# Patient Record
Sex: MALE | Race: WHITE | NOT HISPANIC OR LATINO | URBAN - METROPOLITAN AREA
[De-identification: names, ages, dates, MRNs, and addresses within clinical notes are randomized per-mention and may not be internally consistent; named-entity substitution may affect disease eponyms.]

---

## 2021-10-17 ENCOUNTER — EMERGENCY (EMERGENCY)
Facility: HOSPITAL | Age: 66
LOS: 1 days | Discharge: HOME | End: 2021-10-17
Attending: EMERGENCY MEDICINE | Admitting: EMERGENCY MEDICINE
Payer: MEDICARE

## 2021-10-17 VITALS
DIASTOLIC BLOOD PRESSURE: 88 MMHG | WEIGHT: 149.91 LBS | SYSTOLIC BLOOD PRESSURE: 160 MMHG | HEART RATE: 52 BPM | RESPIRATION RATE: 14 BRPM | HEIGHT: 67 IN | TEMPERATURE: 98 F | OXYGEN SATURATION: 98 %

## 2021-10-17 VITALS
RESPIRATION RATE: 14 BRPM | TEMPERATURE: 98 F | DIASTOLIC BLOOD PRESSURE: 70 MMHG | OXYGEN SATURATION: 98 % | HEART RATE: 53 BPM | SYSTOLIC BLOOD PRESSURE: 122 MMHG

## 2021-10-17 LAB
ALBUMIN SERPL ELPH-MCNC: 3.6 G/DL — SIGNIFICANT CHANGE UP (ref 3.3–5)
ALP SERPL-CCNC: 66 U/L — SIGNIFICANT CHANGE UP (ref 30–120)
ALT FLD-CCNC: 42 U/L DA — SIGNIFICANT CHANGE UP (ref 10–60)
ANION GAP SERPL CALC-SCNC: 6 MMOL/L — SIGNIFICANT CHANGE UP (ref 5–17)
APPEARANCE UR: CLEAR — SIGNIFICANT CHANGE UP
AST SERPL-CCNC: 29 U/L — SIGNIFICANT CHANGE UP (ref 10–40)
BASOPHILS # BLD AUTO: 0.04 K/UL — SIGNIFICANT CHANGE UP (ref 0–0.2)
BASOPHILS NFR BLD AUTO: 0.5 % — SIGNIFICANT CHANGE UP (ref 0–2)
BILIRUB SERPL-MCNC: 0.6 MG/DL — SIGNIFICANT CHANGE UP (ref 0.2–1.2)
BILIRUB UR-MCNC: NEGATIVE — SIGNIFICANT CHANGE UP
BUN SERPL-MCNC: 23 MG/DL — SIGNIFICANT CHANGE UP (ref 7–23)
CALCIUM SERPL-MCNC: 8.6 MG/DL — SIGNIFICANT CHANGE UP (ref 8.4–10.5)
CHLORIDE SERPL-SCNC: 104 MMOL/L — SIGNIFICANT CHANGE UP (ref 96–108)
CO2 SERPL-SCNC: 27 MMOL/L — SIGNIFICANT CHANGE UP (ref 22–31)
COLOR SPEC: YELLOW — SIGNIFICANT CHANGE UP
CREAT SERPL-MCNC: 0.77 MG/DL — SIGNIFICANT CHANGE UP (ref 0.5–1.3)
DIFF PNL FLD: NEGATIVE — SIGNIFICANT CHANGE UP
EOSINOPHIL # BLD AUTO: 0.1 K/UL — SIGNIFICANT CHANGE UP (ref 0–0.5)
EOSINOPHIL NFR BLD AUTO: 1.3 % — SIGNIFICANT CHANGE UP (ref 0–6)
GLUCOSE SERPL-MCNC: 97 MG/DL — SIGNIFICANT CHANGE UP (ref 70–99)
GLUCOSE UR QL: NEGATIVE MG/DL — SIGNIFICANT CHANGE UP
HCT VFR BLD CALC: 41.9 % — SIGNIFICANT CHANGE UP (ref 39–50)
HGB BLD-MCNC: 14.1 G/DL — SIGNIFICANT CHANGE UP (ref 13–17)
IMM GRANULOCYTES NFR BLD AUTO: 0.3 % — SIGNIFICANT CHANGE UP (ref 0–1.5)
KETONES UR-MCNC: NEGATIVE — SIGNIFICANT CHANGE UP
LEUKOCYTE ESTERASE UR-ACNC: NEGATIVE — SIGNIFICANT CHANGE UP
LIDOCAIN IGE QN: 118 U/L — SIGNIFICANT CHANGE UP (ref 73–393)
LYMPHOCYTES # BLD AUTO: 0.96 K/UL — LOW (ref 1–3.3)
LYMPHOCYTES # BLD AUTO: 12.9 % — LOW (ref 13–44)
MCHC RBC-ENTMCNC: 31.4 PG — SIGNIFICANT CHANGE UP (ref 27–34)
MCHC RBC-ENTMCNC: 33.7 GM/DL — SIGNIFICANT CHANGE UP (ref 32–36)
MCV RBC AUTO: 93.3 FL — SIGNIFICANT CHANGE UP (ref 80–100)
MONOCYTES # BLD AUTO: 0.77 K/UL — SIGNIFICANT CHANGE UP (ref 0–0.9)
MONOCYTES NFR BLD AUTO: 10.4 % — SIGNIFICANT CHANGE UP (ref 2–14)
NEUTROPHILS # BLD AUTO: 5.54 K/UL — SIGNIFICANT CHANGE UP (ref 1.8–7.4)
NEUTROPHILS NFR BLD AUTO: 74.6 % — SIGNIFICANT CHANGE UP (ref 43–77)
NITRITE UR-MCNC: NEGATIVE — SIGNIFICANT CHANGE UP
NRBC # BLD: 0 /100 WBCS — SIGNIFICANT CHANGE UP (ref 0–0)
PH UR: 6.5 — SIGNIFICANT CHANGE UP (ref 5–8)
PLATELET # BLD AUTO: 175 K/UL — SIGNIFICANT CHANGE UP (ref 150–400)
POTASSIUM SERPL-MCNC: 4.2 MMOL/L — SIGNIFICANT CHANGE UP (ref 3.5–5.3)
POTASSIUM SERPL-SCNC: 4.2 MMOL/L — SIGNIFICANT CHANGE UP (ref 3.5–5.3)
PROT SERPL-MCNC: 6.8 G/DL — SIGNIFICANT CHANGE UP (ref 6–8.3)
PROT UR-MCNC: NEGATIVE MG/DL — SIGNIFICANT CHANGE UP
RBC # BLD: 4.49 M/UL — SIGNIFICANT CHANGE UP (ref 4.2–5.8)
RBC # FLD: 12.3 % — SIGNIFICANT CHANGE UP (ref 10.3–14.5)
SODIUM SERPL-SCNC: 137 MMOL/L — SIGNIFICANT CHANGE UP (ref 135–145)
SP GR SPEC: 1 — LOW (ref 1.01–1.02)
UROBILINOGEN FLD QL: NEGATIVE MG/DL — SIGNIFICANT CHANGE UP
WBC # BLD: 7.43 K/UL — SIGNIFICANT CHANGE UP (ref 3.8–10.5)
WBC # FLD AUTO: 7.43 K/UL — SIGNIFICANT CHANGE UP (ref 3.8–10.5)

## 2021-10-17 PROCEDURE — 83690 ASSAY OF LIPASE: CPT

## 2021-10-17 PROCEDURE — 81003 URINALYSIS AUTO W/O SCOPE: CPT

## 2021-10-17 PROCEDURE — 85025 COMPLETE CBC W/AUTO DIFF WBC: CPT

## 2021-10-17 PROCEDURE — 96361 HYDRATE IV INFUSION ADD-ON: CPT

## 2021-10-17 PROCEDURE — 74177 CT ABD & PELVIS W/CONTRAST: CPT | Mod: 26,MG

## 2021-10-17 PROCEDURE — 36415 COLL VENOUS BLD VENIPUNCTURE: CPT

## 2021-10-17 PROCEDURE — 96365 THER/PROPH/DIAG IV INF INIT: CPT

## 2021-10-17 PROCEDURE — 74177 CT ABD & PELVIS W/CONTRAST: CPT

## 2021-10-17 PROCEDURE — G1004: CPT

## 2021-10-17 PROCEDURE — 99284 EMERGENCY DEPT VISIT MOD MDM: CPT | Mod: 25

## 2021-10-17 PROCEDURE — 87086 URINE CULTURE/COLONY COUNT: CPT

## 2021-10-17 PROCEDURE — 99285 EMERGENCY DEPT VISIT HI MDM: CPT

## 2021-10-17 PROCEDURE — 80053 COMPREHEN METABOLIC PANEL: CPT

## 2021-10-17 RX ORDER — ROSUVASTATIN CALCIUM 5 MG/1
1 TABLET ORAL
Qty: 0 | Refills: 0 | DISCHARGE

## 2021-10-17 RX ORDER — SODIUM CHLORIDE 9 MG/ML
1000 INJECTION INTRAMUSCULAR; INTRAVENOUS; SUBCUTANEOUS ONCE
Refills: 0 | Status: COMPLETED | OUTPATIENT
Start: 2021-10-17 | End: 2021-10-17

## 2021-10-17 RX ORDER — PIPERACILLIN AND TAZOBACTAM 4; .5 G/20ML; G/20ML
3.38 INJECTION, POWDER, LYOPHILIZED, FOR SOLUTION INTRAVENOUS ONCE
Refills: 0 | Status: COMPLETED | OUTPATIENT
Start: 2021-10-17 | End: 2021-10-17

## 2021-10-17 RX ORDER — ASPIRIN/CALCIUM CARB/MAGNESIUM 324 MG
0 TABLET ORAL
Qty: 0 | Refills: 0 | DISCHARGE

## 2021-10-17 RX ADMIN — PIPERACILLIN AND TAZOBACTAM 3.38 GRAM(S): 4; .5 INJECTION, POWDER, LYOPHILIZED, FOR SOLUTION INTRAVENOUS at 16:30

## 2021-10-17 RX ADMIN — SODIUM CHLORIDE 1000 MILLILITER(S): 9 INJECTION INTRAMUSCULAR; INTRAVENOUS; SUBCUTANEOUS at 13:40

## 2021-10-17 RX ADMIN — SODIUM CHLORIDE 1000 MILLILITER(S): 9 INJECTION INTRAMUSCULAR; INTRAVENOUS; SUBCUTANEOUS at 14:30

## 2021-10-17 RX ADMIN — PIPERACILLIN AND TAZOBACTAM 200 GRAM(S): 4; .5 INJECTION, POWDER, LYOPHILIZED, FOR SOLUTION INTRAVENOUS at 15:53

## 2021-10-17 NOTE — ED ADULT NURSE NOTE - OBJECTIVE STATEMENT
Right positional groin pain since last night, no urinary complaints, no fever. Denies trauma, no testicular pain.

## 2021-10-17 NOTE — ED PROVIDER NOTE - PROGRESS NOTE DETAILS
Giovanni WATTERS for attending Dr. Giang: 64 y/o M with a PMHx of HLD presents to the ED c/o R groin pain since yesterday worse w/ palpation or positional changes.  No prior abd surgery. Denies fever, chills, N/V/D, constipation, dysuria, hematuria, frequency, testicular pain, or any other complaints. PMD: Dr. Huffman  Physical Exam: WD, WN, in NAD, PERRL, Pink conjunctiva, no scleral icterus, moist mucous membranes,  abd: soft & nontender RUQ LUQ LLQ RLQ, +tenderness inguinal region with questionable hernia, testicles nontender, penis: no rash, lesions, or discharge. Giovanni WATTERS for attending Dr. Giang: 66 y/o M with a PMHx of HLD presents to the ED c/o R groin pain since yesterday worse w/ palpation or positional changes.  No prior abd surgery. Denies fever, chills, N/V/D, constipation, dysuria, hematuria, frequency, testicular pain, or any other complaints. Pt declining pain meds. PMD: Dr. Huffman  Physical Exam: WD, WN, in NAD, PERRL, Pink conjunctiva, no scleral icterus, moist mucous membranes,  abd: soft & nontender RUQ LUQ LLQ RLQ, +tenderness inguinal region with questionable hernia, testicles nontender, penis: no rash, lesions, or discharge. Giovanni WATTERS for attending Dr. Giang: 66 y/o M with a PMHx of HLD presents to the ED c/o R groin pain since yesterday worse w/ palpation or positional changes.  No prior abd surgery. Denies fever, chills, N/V/D, constipation, dysuria, hematuria, frequency, testicular pain, or any other complaints. Pt declining pain meds. PMD: Dr. Huffman  Physical Exam: WD, WN, in NAD, PERRL, Pink conjunctiva, no scleral icterus, moist mucous membranes, s1s2 rrr, lungs cta, abd: soft & nontender RUQ LUQ LLQ  +tenderness right inguinal region with questionable hernia, testicles nontender, penis: no rash, lesions, or discharge. Reevaluated patient at bedside.  Patient feeling well. Discussed the results of labs and CT. Copies of all available reports given.   An opportunity to ask questions was provided.  Discussed the importance of prompt, close medical follow-up.  Patient will return with any changes, concerns or persistent/worsening symptoms.  Understanding of all instructions verbalized.

## 2021-10-17 NOTE — ED PROVIDER NOTE - PATIENT PORTAL LINK FT
You can access the FollowMyHealth Patient Portal offered by [facility] by registering at the following website: [email or web address]. By joining [employer]’s FollowMyHealth portal, you will also be able to view your health information using other applications (apps) compatible with our system.

## 2021-10-17 NOTE — ED PROVIDER NOTE - CARE PROVIDER_API CALL
May Reyes)  Gastroenterology  [address] [place] [address]  Phone: [phone]  Fax: [phone]  Follow Up Time: 1-3 Days

## 2021-10-17 NOTE — ED PROVIDER NOTE - CLINICAL SUMMARY MEDICAL DECISION MAKING FREE TEXT BOX
65 M with R inguinal pain since last night, no injury, no urinary complaints. TTP R inguinal area. R/o hernia or acute intra-abdominal pathology - declined analgesia at this time - labs, CT abd/pelvis pending

## 2021-10-17 NOTE — ED PROVIDER NOTE - OBJECTIVE STATEMENT
66 y/o M with a PMHx of HLD presents to the ED c/o R groin pain since last night. Worse w/ palpation or positional changes.  No fall, trauma, injury. No prior abd surgery. Denies fever, chills, N/V/D, constipation, dysuria, hematuria, frequency, testicular pain, or any other complaints. Pt declining pain meds at this time. PMD: Dr. Huffman

## 2021-10-19 LAB
CULTURE RESULTS: NO GROWTH — SIGNIFICANT CHANGE UP
SPECIMEN SOURCE: SIGNIFICANT CHANGE UP

## 2024-08-23 NOTE — ED PROVIDER NOTE - NSICDXPASTMEDICALHX_GEN_ALL_CORE_FT
Resident
PAST MEDICAL HISTORY:  H/O vasectomy     History of tonsillectomy     History of tonsillectomy and adenoidectomy     HLD (hyperlipidemia)

## 2025-02-01 ENCOUNTER — EMERGENCY (EMERGENCY)
Facility: HOSPITAL | Age: 70
LOS: 1 days | Discharge: HOME | End: 2025-02-01
Attending: EMERGENCY MEDICINE | Admitting: EMERGENCY MEDICINE
Payer: MEDICARE

## 2025-02-01 VITALS
OXYGEN SATURATION: 97 % | DIASTOLIC BLOOD PRESSURE: 78 MMHG | RESPIRATION RATE: 18 BRPM | HEART RATE: 68 BPM | SYSTOLIC BLOOD PRESSURE: 128 MMHG

## 2025-02-01 VITALS
SYSTOLIC BLOOD PRESSURE: 131 MMHG | HEART RATE: 47 BPM | TEMPERATURE: 98 F | RESPIRATION RATE: 19 BRPM | OXYGEN SATURATION: 96 % | HEIGHT: 68 IN | DIASTOLIC BLOOD PRESSURE: 80 MMHG | WEIGHT: 149.91 LBS

## 2025-02-01 PROBLEM — Z98.52 VASECTOMY STATUS: Chronic | Status: ACTIVE | Noted: 2021-10-17

## 2025-02-01 PROBLEM — Z98.890 OTHER SPECIFIED POSTPROCEDURAL STATES: Chronic | Status: ACTIVE | Noted: 2021-10-17

## 2025-02-01 PROBLEM — Z90.89 ACQUIRED ABSENCE OF OTHER ORGANS: Chronic | Status: ACTIVE | Noted: 2021-10-17

## 2025-02-01 PROBLEM — E78.5 HYPERLIPIDEMIA, UNSPECIFIED: Chronic | Status: ACTIVE | Noted: 2021-10-17

## 2025-02-01 PROCEDURE — 93971 EXTREMITY STUDY: CPT

## 2025-02-01 PROCEDURE — 99284 EMERGENCY DEPT VISIT MOD MDM: CPT | Mod: 25

## 2025-02-01 PROCEDURE — 99284 EMERGENCY DEPT VISIT MOD MDM: CPT | Mod: FS

## 2025-02-01 PROCEDURE — 93971 EXTREMITY STUDY: CPT | Mod: 26,LT

## 2025-02-01 RX ORDER — NAPROXEN 500 MG
1 TABLET ORAL
Qty: 20 | Refills: 0
Start: 2025-02-01 | End: 2025-02-10

## 2025-02-01 NOTE — ED ADULT TRIAGE NOTE - CHIEF COMPLAINT QUOTE
pt ambulated into the ED C/O left calf pain s/p skiing accident 8 days ago. edema noted to left calf, and ecchymosis noted to left heel.

## 2025-02-01 NOTE — ED ADULT NURSE NOTE - NSICDXPASTMEDICALHX_GEN_ALL_CORE_FT
PAST MEDICAL HISTORY:  H/O vasectomy     History of tonsillectomy     History of tonsillectomy and adenoidectomy     HLD (hyperlipidemia)

## 2025-02-01 NOTE — ED ADULT NURSE NOTE - NS ED NURSE DC PT EDUCATION RESOURCES
General Sunscreen Counseling: Recommend SPF 50 or higher, reapplying every 80 minutes or directly upon exiting water. Detail Level: Detailed Yes

## 2025-02-01 NOTE — ED PROVIDER NOTE - OBJECTIVE STATEMENT
69-year-old male with past medical history of hyperlipidemia presents today due to a left calf injury.  Patient 8 days ago was skiing in which he fell and felt pain to his calf.  Patient notes since then his calf increased in size with bruising but is smaller than initial injury.  Patient presented today to rule out a blood clot.  Patient denies numbness, weakness, tingling, limited range of motion, chest pain, shortness of breath, history of blood clots, blood thinning medications, or any other complaints.

## 2025-02-01 NOTE — ED PROVIDER NOTE - NSFOLLOWUPINSTRUCTIONS_ED_ALL_ED_FT
1) Follow-up with Orthopedics, See referred doctor. Call today/next business day for close, prompt follow-up.  2) Return to Emergency room for any worsening or persistent pain, weakness, numbness, fever, color change to extremity, or any other concerning symptoms.  3) Take ibuprofen 600 mg every  6 hours as needed.   4) You can consider discussing with your doctor if physical therapy or further imaging as an MRI may be beneficial.    Hematoma    WHAT YOU NEED TO KNOW:    A hematoma is a collection of blood. A bruise is a type of hematoma. A hematoma may form in a muscle or in the tissues just under the skin. A hematoma that forms under the skin will feel like a bump or hard mass. Hematomas can happen anywhere in your body, including in your brain. Your body may break down and absorb a mild hematoma on its own. A more serious hematoma may need treatment.    DISCHARGE INSTRUCTIONS:    Medicines: You may need any of the following:    Prescription pain medicine may be given. Ask how to take this medicine safely.    NSAIDs, such as ibuprofen, help decrease swelling, pain, and fever. This medicine is available with or without a doctor's order. NSAIDs can cause stomach bleeding or kidney problems in certain people. If you take blood thinner medicine, always ask your healthcare provider if NSAIDs are safe for you. Always read the medicine label and follow directions.    Antibiotics prevent or treat a bacterial infection.    Take your medicine as directed. Contact your healthcare provider if you think your medicine is not helping or if you have side effects. Tell your provider if you are allergic to any medicine. Keep a list of the medicines, vitamins, and herbs you take. Include the amounts, and when and why you take them. Bring the list or the pill bottles to follow-up visits. Carry your medicine list with you in case of an emergency.  Return to the emergency department if:    You have new or worsening pain, or pain that does not get better with medicine.    You have a fever.    You have trouble moving the body part that has the hematoma.  Contact your healthcare provider if:    You have questions or concerns about your condition or care.    Follow up with your healthcare provider as directed: You may need to have surgery if your hematoma is severe. You may also need other tests to make sure there is no other damage that needs to be treated. Write down your questions so you remember to ask them during your visits.    Self-care:    Rest the area. Rest will help your body heal and will also help prevent more damage.    Apply ice as directed. Ice helps reduce swelling. Ice may also help prevent tissue damage. Use an ice pack, or put crushed ice in a bag. Cover it with a towel. Place it on your hematoma for 20 minutes every hour, or as directed. Ask how many times each day to apply ice, and for how many days.    Compress the injury if possible. Lightly wrap the injury with an elastic or soft bandage. This may help control swelling. Ask your healthcare provider how to wrap your injury properly.    Elevate the area as directed. If possible, raise the area above the level of your heart as often as you can. This will help decrease swelling.    Keep the hematoma covered with a bandage. This will help protect the area while it heals. 1) Follow-up with Orthopedics, See referred doctor. Call today/next business day for close, prompt follow-up.  2) Return to Emergency room for any worsening or persistent pain, weakness, numbness, fever, color change to extremity, or any other concerning symptoms.  3) Take naproxen and flexeril as directed.   4) You can consider discussing with your doctor if physical therapy or further imaging as an MRI may be beneficial.    Hematoma    WHAT YOU NEED TO KNOW:    A hematoma is a collection of blood. A bruise is a type of hematoma. A hematoma may form in a muscle or in the tissues just under the skin. A hematoma that forms under the skin will feel like a bump or hard mass. Hematomas can happen anywhere in your body, including in your brain. Your body may break down and absorb a mild hematoma on its own. A more serious hematoma may need treatment.    DISCHARGE INSTRUCTIONS:    Medicines: You may need any of the following:    Prescription pain medicine may be given. Ask how to take this medicine safely.    NSAIDs, such as ibuprofen, help decrease swelling, pain, and fever. This medicine is available with or without a doctor's order. NSAIDs can cause stomach bleeding or kidney problems in certain people. If you take blood thinner medicine, always ask your healthcare provider if NSAIDs are safe for you. Always read the medicine label and follow directions.    Antibiotics prevent or treat a bacterial infection.    Take your medicine as directed. Contact your healthcare provider if you think your medicine is not helping or if you have side effects. Tell your provider if you are allergic to any medicine. Keep a list of the medicines, vitamins, and herbs you take. Include the amounts, and when and why you take them. Bring the list or the pill bottles to follow-up visits. Carry your medicine list with you in case of an emergency.  Return to the emergency department if:    You have new or worsening pain, or pain that does not get better with medicine.    You have a fever.    You have trouble moving the body part that has the hematoma.  Contact your healthcare provider if:    You have questions or concerns about your condition or care.    Follow up with your healthcare provider as directed: You may need to have surgery if your hematoma is severe. You may also need other tests to make sure there is no other damage that needs to be treated. Write down your questions so you remember to ask them during your visits.    Self-care:    Rest the area. Rest will help your body heal and will also help prevent more damage.    Apply ice as directed. Ice helps reduce swelling. Ice may also help prevent tissue damage. Use an ice pack, or put crushed ice in a bag. Cover it with a towel. Place it on your hematoma for 20 minutes every hour, or as directed. Ask how many times each day to apply ice, and for how many days.    Compress the injury if possible. Lightly wrap the injury with an elastic or soft bandage. This may help control swelling. Ask your healthcare provider how to wrap your injury properly.    Elevate the area as directed. If possible, raise the area above the level of your heart as often as you can. This will help decrease swelling.    Keep the hematoma covered with a bandage. This will help protect the area while it heals.

## 2025-02-01 NOTE — ED PROVIDER NOTE - CARE PROVIDER_API CALL
Marv Daugherty  Orthopaedic Surgery  [address] [place] [id number]  Phone: [phone]  Fax: [phone]  Follow Up Time: 1-3 Days   Marv Daugherty  Orthopaedic Surgery  [address] [place] [address]  Phone: [phone]  Fax: [phone]  Follow Up Time: 1-3 Days    Rd Odell  Orthopaedic Surgery  [address] [place] [address], [address]  [place] [address]  Phone: [phone]  Fax: [phone]  Follow Up Time:

## 2025-02-01 NOTE — ED PROVIDER NOTE - CLINICAL SUMMARY MEDICAL DECISION MAKING FREE TEXT BOX
69 male PMH HLD, GERD c/o left calf pain s/p skiing 9 days ago, on exam has left ankle eccymosis, swelling, tender to calf, achilles tendon intact, f/u [facility] r/o DVT, suspect calf tear, crutches, f/u orthopedics

## 2025-02-01 NOTE — ED PROVIDER NOTE - PATIENT PORTAL LINK FT
You can access the FollowMyHealth Patient Portal offered by [facility] by registering at the following website: [email or web address]. By joining [employer]’s FollowMyHealth portal, you will also be able to view your health information using other applications (apps) compatible with our system.

## 2025-02-01 NOTE — ED ADULT NURSE NOTE - OBJECTIVE STATEMENT
Patient states he had a skiing accident, his left ski got stuck in the snow, and his calf was suddenly stretched.  Calf is significantly swollen, blood is pooling in area of ankle.  He states that at rest it is minor discomfort, but walking is quite painful.  He is concerned for a DVT.

## 2025-02-01 NOTE — ED PROVIDER NOTE - PHYSICAL EXAMINATION
Constitutional: Awake, Alert, non-toxic. NAD. Well appearing, well nourished.   HEAD: Normocephalic, atraumatic.   EYES: EOM intact, conjunctiva and sclera are clear bilaterally.   ENT: No rhinorrhea, patent, mucous membranes pink/moist, no drooling or stridor.   NECK: Supple, non-tender  CARDIOVASCULAR: Normal S1, S2; regular rate and rhythm.  RESPIRATORY: Normal respiratory effort; breath sounds CTAB, no wheezes, rhonchi, or rales. Speaking in full sentences. No accessory muscle use.   ABDOMEN: Soft; non-tender, non-distended.   EXTREMITIES: Full passive and active ROM in all extremities; (+) left calf hematoma, (+) soft compartments, DP/PT intact, negative Morrow test, ankle and knee non-tender to palpation; distal pulses palpable and symmetric  SKIN: Warm, dry; good skin turgor, no apparent lesions or rashes, no ecchymosis, brisk capillary refill.  NEURO: A&O x3. Sensory and motor functions are grossly intact. Speech is normal. Appearance and judgement seem appropriate for gender and age.

## 2025-02-01 NOTE — ED PROVIDER NOTE - PROVIDER TOKENS
PROVIDER:[TOKEN:[[id number]],FOLLOWUP:[1-3 Days]] PROVIDER:[TOKEN:[[id number]],FOLLOWUP:[1-3 Days]],PROVIDER:[TOKEN:[[id number]]]